# Patient Record
Sex: FEMALE | Race: WHITE | NOT HISPANIC OR LATINO
[De-identification: names, ages, dates, MRNs, and addresses within clinical notes are randomized per-mention and may not be internally consistent; named-entity substitution may affect disease eponyms.]

---

## 2020-07-15 ENCOUNTER — DASHBOARD ENCOUNTERS (OUTPATIENT)
Age: 50
End: 2020-07-15

## 2020-07-15 ENCOUNTER — OFFICE VISIT (OUTPATIENT)
Dept: URBAN - METROPOLITAN AREA CLINIC 128 | Facility: CLINIC | Age: 50
End: 2020-07-15

## 2020-07-15 ENCOUNTER — OFFICE VISIT (OUTPATIENT)
Dept: URBAN - METROPOLITAN AREA CLINIC 128 | Facility: CLINIC | Age: 50
End: 2020-07-15
Payer: COMMERCIAL

## 2020-07-15 DIAGNOSIS — R10.9 ABDOMINAL PAIN: ICD-10-CM

## 2020-07-15 DIAGNOSIS — Z12.11 COLON CANCER SCREENING: ICD-10-CM

## 2020-07-15 DIAGNOSIS — R12 HEARTBURN: ICD-10-CM

## 2020-07-15 DIAGNOSIS — R10.84 ABDOMINAL CRAMPING, GENERALIZED: ICD-10-CM

## 2020-07-15 DIAGNOSIS — K58.9 IBS (IRRITABLE BOWEL SYNDROME): ICD-10-CM

## 2020-07-15 PROCEDURE — 99204 OFFICE O/P NEW MOD 45 MIN: CPT | Performed by: PHYSICIAN ASSISTANT

## 2020-07-15 PROCEDURE — 3017F COLORECTAL CA SCREEN DOC REV: CPT | Performed by: PHYSICIAN ASSISTANT

## 2020-07-15 PROCEDURE — 1036F TOBACCO NON-USER: CPT | Performed by: PHYSICIAN ASSISTANT

## 2020-07-15 PROCEDURE — G8427 DOCREV CUR MEDS BY ELIG CLIN: HCPCS | Performed by: PHYSICIAN ASSISTANT

## 2020-07-15 PROCEDURE — G9903 PT SCRN TBCO ID AS NON USER: HCPCS | Performed by: PHYSICIAN ASSISTANT

## 2020-07-15 PROCEDURE — G8420 CALC BMI NORM PARAMETERS: HCPCS | Performed by: PHYSICIAN ASSISTANT

## 2020-07-15 RX ORDER — TRAZODONE HYDROCHLORIDE 50 MG/1
1 TABLET AT BEDTIME AS NEEDED TABLET, FILM COATED ORAL ONCE A DAY
Status: ACTIVE | COMMUNITY

## 2020-07-15 RX ORDER — HYOSCYAMINE SULFATE 0.38 MG/1
1 TABLET TABLET, EXTENDED RELEASE ORAL
Qty: 60 TABLET | Refills: 0 | OUTPATIENT
Start: 2020-07-15 | End: 2020-08-14

## 2020-07-15 RX ORDER — CALCIUM CITRATE/VITAMIN D3 315MG-6.25
1 TABLET TABLET ORAL TWICE A DAY
Status: ACTIVE | COMMUNITY

## 2020-07-15 RX ORDER — FAMOTIDINE 20 MG/1
1 TABLET AT BEDTIME AS NEEDED TABLET, FILM COATED ORAL ONCE A DAY
Status: ACTIVE | COMMUNITY

## 2020-07-15 RX ORDER — ESTRADIOL 0.1 MG/D
1 PATCH TO SKIN PATCH TRANSDERMAL
Status: ACTIVE | COMMUNITY

## 2020-07-15 RX ORDER — RIFAXIMIN 550 MG/1
1 TABLET TABLET ORAL THREE TIMES A DAY
Qty: 42 TABLET | Refills: 0 | OUTPATIENT
Start: 2020-07-15

## 2020-07-15 NOTE — HPI-TODAY'S VISIT:
Patient has had IBS since childhood. She has had chronic abdominal pain, bloating, diarrhea, constipation which is worse with stress since childhood. She had symptoms worse with menses but she has had a hysterectomy. She has known PCOS, endometriosis, ovarian cyst and h/o benign ovarian tumor. She currently has 203 BMs a day. She elicits some heartburn and takes pepcid for it. Last colonoscopy was normal 12 years ago and last egd was 12 years ago as well. She had a physical with her pcp last week and these records were requested to review mostly her cbc, cmp, tsh.

## 2020-07-15 NOTE — PHYSICAL EXAM GASTROINTESTINAL
Abdomen , soft, nondistended , no guarding or rigidity , no masses palpable , normal bowel sounds, negative Sanchez's sign, negative CVA tenderness bilaterally. Mild tenderness to palpation in the RUQ and epigastric regions noted. Liver and Spleen , no hepatosplenomegaly Rectal deferred

## 2020-07-24 ENCOUNTER — LAB OUTSIDE AN ENCOUNTER (OUTPATIENT)
Dept: URBAN - METROPOLITAN AREA CLINIC 128 | Facility: CLINIC | Age: 50
End: 2020-07-24

## 2020-07-30 LAB
C-REACTIVE PROTEIN, QUANT: <1
DEAMIDATED GLIADIN ABS, IGA: 3
DEAMIDATED GLIADIN ABS, IGG: 3
ENDOMYSIAL ANTIBODY IGA: NEGATIVE
IMMUNOGLOBULIN A, QN, SERUM: 144
LIPASE: 32
T-TRANSGLUTAMINASE (TTG) IGA: <2
T-TRANSGLUTAMINASE (TTG) IGG: <2

## 2020-08-13 ENCOUNTER — OFFICE VISIT (OUTPATIENT)
Dept: URBAN - METROPOLITAN AREA SURGERY CENTER 31 | Facility: SURGERY CENTER | Age: 50
End: 2020-08-13
Payer: COMMERCIAL

## 2020-08-13 DIAGNOSIS — K20.8 CORROSIVE ESOPHAGITIS: ICD-10-CM

## 2020-08-13 DIAGNOSIS — K29.80 ACUTE DUODENITIS: ICD-10-CM

## 2020-08-13 DIAGNOSIS — K31.89 ACQUIRED DEFORMITY OF DUODENUM: ICD-10-CM

## 2020-08-13 DIAGNOSIS — Z12.11 COLON CANCER SCREENING: ICD-10-CM

## 2020-08-13 PROCEDURE — G8907 PT DOC NO EVENTS ON DISCHARG: HCPCS | Performed by: INTERNAL MEDICINE

## 2020-08-13 PROCEDURE — 43239 EGD BIOPSY SINGLE/MULTIPLE: CPT | Performed by: INTERNAL MEDICINE

## 2020-08-13 PROCEDURE — G0121 COLON CA SCRN NOT HI RSK IND: HCPCS | Performed by: INTERNAL MEDICINE

## 2020-08-13 PROCEDURE — G9935 CANC NOT DETECTD DURING SRCN: HCPCS | Performed by: INTERNAL MEDICINE

## 2020-08-19 ENCOUNTER — ERX REFILL RESPONSE (OUTPATIENT)
Dept: URBAN - METROPOLITAN AREA CLINIC 128 | Facility: CLINIC | Age: 50
End: 2020-08-19

## 2020-08-19 RX ORDER — HYOSCYAMINE SULFATE 0.38 MG/1
TAKE 1 TABLET BY MOUTH EVERY 12 HOURS TABLET ORAL
Qty: 180 TABLET | Refills: 0 | OUTPATIENT

## 2020-08-19 RX ORDER — HYOSCYAMINE SULFATE 0.38 MG/1
1 TABLET TABLET, EXTENDED RELEASE ORAL
Qty: 60 | Refills: 0 | OUTPATIENT

## 2025-02-11 ENCOUNTER — TELEPHONE ENCOUNTER (OUTPATIENT)
Dept: URBAN - METROPOLITAN AREA CLINIC 128 | Facility: CLINIC | Age: 55
End: 2025-02-11

## 2025-02-11 ENCOUNTER — OFFICE VISIT (OUTPATIENT)
Dept: URBAN - METROPOLITAN AREA CLINIC 128 | Facility: CLINIC | Age: 55
End: 2025-02-11
Payer: COMMERCIAL

## 2025-02-11 VITALS
BODY MASS INDEX: 23.99 KG/M2 | HEART RATE: 98 BPM | SYSTOLIC BLOOD PRESSURE: 120 MMHG | HEIGHT: 65 IN | OXYGEN SATURATION: 97 % | DIASTOLIC BLOOD PRESSURE: 80 MMHG | WEIGHT: 144 LBS | TEMPERATURE: 97.7 F

## 2025-02-11 DIAGNOSIS — K64.9 HEMORRHOID: ICD-10-CM

## 2025-02-11 DIAGNOSIS — K60.2 ANAL FISSURE: ICD-10-CM

## 2025-02-11 DIAGNOSIS — K62.5 RECTAL BLEEDING: ICD-10-CM

## 2025-02-11 DIAGNOSIS — Z85.3 PERSONAL HISTORY OF BREAST CANCER: ICD-10-CM

## 2025-02-11 DIAGNOSIS — K59.00 CONSTIPATION, UNSPECIFIED CONSTIPATION TYPE: ICD-10-CM

## 2025-02-11 PROBLEM — 429087003: Status: ACTIVE | Noted: 2025-02-11

## 2025-02-11 PROBLEM — 14760008: Status: ACTIVE | Noted: 2025-02-11

## 2025-02-11 PROCEDURE — 99204 OFFICE O/P NEW MOD 45 MIN: CPT | Performed by: PHYSICIAN ASSISTANT

## 2025-02-11 RX ORDER — HYDROCORTISONE ACETATE 25 MG/1
1 SUPPOSITORY SUPPOSITORY RECTAL ONCE A DAY
Qty: 14 SUPPOSITORY | Refills: 2 | OUTPATIENT
Start: 2025-02-11 | End: 2025-03-25

## 2025-02-11 RX ORDER — HYOSCYAMINE SULFATE 0.38 MG/1
TAKE 1 TABLET BY MOUTH EVERY 12 HOURS TABLET ORAL
Qty: 180 TABLET | Refills: 0 | Status: DISCONTINUED | COMMUNITY

## 2025-02-11 RX ORDER — RIFAXIMIN 550 MG/1
1 TABLET TABLET ORAL THREE TIMES A DAY
Qty: 42 TABLET | Refills: 0 | Status: DISCONTINUED | COMMUNITY
Start: 2020-07-15

## 2025-02-11 RX ORDER — PEMBROLIZUMAB 25 MG/ML
8 ML INJECTION, SOLUTION INTRAVENOUS
Status: ACTIVE | COMMUNITY

## 2025-02-11 RX ORDER — TRAZODONE HYDROCHLORIDE 50 MG/1
1 TABLET AT BEDTIME AS NEEDED TABLET ORAL ONCE A DAY
Status: DISCONTINUED | COMMUNITY

## 2025-02-11 RX ORDER — CALCIUM CITRATE/VITAMIN D3 315MG-6.25
1 TABLET TABLET ORAL TWICE A DAY
Status: DISCONTINUED | COMMUNITY

## 2025-02-11 RX ORDER — FAMOTIDINE 20 MG/1
1 TABLET AT BEDTIME AS NEEDED TABLET, FILM COATED ORAL ONCE A DAY
Status: ACTIVE | COMMUNITY

## 2025-02-11 RX ORDER — ESTRADIOL 0.1 MG/D
1 PATCH TO SKIN PATCH TRANSDERMAL
Status: DISCONTINUED | COMMUNITY

## 2025-02-11 NOTE — HPI-OTHER HISTORIES
The patient presents today due to the following symptoms: bright red blood per rectum Duration of symptoms: x few weeks Associated symptoms: She elicits feeling like "she is passing glass during a BM". Has had some constipation following her chometherapy she just recently completed due to her breast cancer. She is due to have rdiation therpy for 6 months now. Her oncologist is Dr. Dagoberto Oakley. Her breast specialist is Dr. DISLA. What alleviates symptoms: nothing What aggravates symptoms: having a BM There is no family history of colon polyps or colon cancer. Patient denies any heart, lung, or kidney problems.  Patient denies any blood thinners or oxygen therapy. Denies any dialysis. Denies any pacemaker or defibrillator.  Last colonoscopy: 2020, no specimens were collected, advised to repeat it in 5-10 years

## 2025-02-11 NOTE — PHYSICAL EXAM GASTROINTESTINAL
Abdomen , soft, nontender, nondistended , no guarding or rigidity , no masses palpable , normal bowel sounds , Liver and Spleen , no hepatomegaly present Rectal  , normal sphincter tone, small anal fissure noted,  no external hemorrhoids noted, non-bleeding internal hemorrhoid was noted, no rectal masses or bleeding present. Rectal examination was performed with a chaperone present